# Patient Record
Sex: MALE | Race: WHITE | Employment: FULL TIME | URBAN - METROPOLITAN AREA
[De-identification: names, ages, dates, MRNs, and addresses within clinical notes are randomized per-mention and may not be internally consistent; named-entity substitution may affect disease eponyms.]

---

## 2020-09-08 ENCOUNTER — HOSPITAL ENCOUNTER (EMERGENCY)
Facility: CLINIC | Age: 50
Discharge: HOME | End: 2020-09-08
Attending: EMERGENCY MEDICINE | Admitting: EMERGENCY MEDICINE
Payer: OTHER GOVERNMENT

## 2020-09-08 VITALS
HEART RATE: 79 BPM | BODY MASS INDEX: 33.64 KG/M2 | RESPIRATION RATE: 18 BRPM | HEIGHT: 70 IN | WEIGHT: 235 LBS | OXYGEN SATURATION: 98 % | DIASTOLIC BLOOD PRESSURE: 97 MMHG | TEMPERATURE: 97.7 F | SYSTOLIC BLOOD PRESSURE: 143 MMHG

## 2020-09-08 DIAGNOSIS — R19.7 DIARRHEA, UNSPECIFIED TYPE: ICD-10-CM

## 2020-09-08 DIAGNOSIS — Z20.822 EXPOSURE TO COVID-19 VIRUS: ICD-10-CM

## 2020-09-08 PROCEDURE — U0003 INFECTIOUS AGENT DETECTION BY NUCLEIC ACID (DNA OR RNA); SEVERE ACUTE RESPIRATORY SYNDROME CORONAVIRUS 2 (SARS-COV-2) (CORONAVIRUS DISEASE [COVID-19]), AMPLIFIED PROBE TECHNIQUE, MAKING USE OF HIGH THROUGHPUT TECHNOLOGIES AS DESCRIBED BY CMS-2020-01-R: HCPCS | Performed by: EMERGENCY MEDICINE

## 2020-09-08 PROCEDURE — C9803 HOPD COVID-19 SPEC COLLECT: HCPCS

## 2020-09-08 PROCEDURE — 99283 EMERGENCY DEPT VISIT LOW MDM: CPT

## 2020-09-08 ASSESSMENT — MIFFLIN-ST. JEOR: SCORE: 1922.2

## 2020-09-08 NOTE — ED TRIAGE NOTES
Needs a doctors note stating he can return to work after missing 2 days of work last week due to a headache and diarrhea.  No complaints now.  ABCDs intact.

## 2020-09-08 NOTE — ED AVS SNAPSHOT
Emergency Department  [address] [place] [address]  Phone:  [phone]  Fax:  [phone]                                    Hunter De Souza   MRN: [id number]    Department:   Emergency Department   Date of Visit:  9/8/2020           After Visit Summary Signature Page    I have received my discharge instructions, and my questions have been answered. I have discussed any challenges I see with this plan with the nurse or doctor.    ..........................................................................................................................................  Patient/Patient Representative Signature      ..........................................................................................................................................  Patient Representative Print Name and Relationship to Patient    ..................................................               ................................................  Date                                   Time    ..........................................................................................................................................  Reviewed by Signature/Title    ...................................................              ..............................................  Date                                               Time          22EPIC Rev 08/18

## 2020-09-08 NOTE — ED PROVIDER NOTES
"History     Chief Complaint:  Covid 19 Testing       HPI   Hunter De Souza is a 50 year old male who presents with symptoms of mild headache area, and congestion which began 1 week ago.  Symptoms have completely resolved.  However, he needed to call into work sick last week and they will not allow him back to work unless he has a negative COVID test.  The patient notes that all symptoms have been resolved throughout the weekend.  He has not taken any over-the-counter medications.  He denies any abdominal pain and notes he has been eating and drinking normally.  He never suffered a fever with this.  He denies any exposures to coronavirus.  Other members that live in his household are healthy and well.  He is simply requesting a note for work and has no other concerns today.    Allergies:  Chocolate     Medications:    Medications reviewed. No current medications.     Past Medical History:    Medical history reviewed. No pertinent medical history.    Past Surgical History:    Surgical history reviewed. No pertinent surgical history.    Family History:    Family history reviewed. No pertinent family history.      Social History:  Smoking Status: former      Review of Systems  Pertinent positives and negatives as above.  A 14-point review of systems was performed with all other systems reviewed as negative.      Physical Exam     Patient Vitals for the past 24 hrs:   BP Temp Temp src Pulse Resp SpO2 Height Weight   09/08/20 1438 (!) 143/97 97.7  F (36.5  C) Oral 79 18 98 % 1.778 m (5' 10\") 106.6 kg (235 lb)   09/08/20 1430 (!) 140/80 98.1  F (36.7  C) Temporal 83 16 99 % 1.778 m (5' 10\") --        Physical Exam  Eye:  Pupils are equal, round, and reactive.  Extraocular movements intact.    ENT:  No rhinorrhea.  Moist mucus membranes.  Normal tongue and tonsil.    Cardiac:  Regular rate and rhythm.  No murmurs, gallops, or rubs.    Pulmonary:  Clear to auscultation bilaterally.  No wheezes, rales, or rhonchi.    Abdomen:  " Positive bowel sounds.  Abdomen is soft and non-distended, without focal tenderness.    Musculoskeletal:  Normal movement of all extremities without evidence for deficit.    Skin:  Warm and dry without rashes.    Neurologic:  Non-focal exam without asymmetric weakness or numbness.     Psychiatric:  Normal affect with appropriate interaction with examiner.      Emergency Department Course     Laboratory:  Laboratory findings were communicated with the patient who voiced understanding of the findings.    Symptomatic COVID-19 Virus (Coronavirus) by PCR Nasopharyngeal swab: pending      Emergency Department Course:  Past medical records, nursing notes, and vitals reviewed.    1444 I performed an exam of the patient as documented above.      Findings and plan explained to the Patient. Patient discharged home with instructions regarding supportive care, medications, and reasons to return. The importance of close follow-up was reviewed.     Impression & Plan   Covid-19  Hunter De Souza was evaluated during a global COVID-19 pandemic, which necessitated consideration that the patient might be at risk for infection with the SARS-CoV-2 virus that causes COVID-19.   Applicable protocols for evaluation were followed during the patient's care.   COVID-19 was considered as part of the patient's evaluation. The plan for testing is:  a test was obtained during this visit.     Medical Decision Making:  Hunter De Souza is a 50 year old male who presents to the emergency department today with requests for a work note to return to work.  His symptoms seem highly unlikely to represent a COVID-19 infection.  However, he is not yet 10 days out from the first day of his symptoms.  We will send a COVID test today which should result later today or early tomorrow.  I wrote him a note for work that he should be able to return tomorrow if this study is negative.  If the test is positive, he will require a longer quarantine period.  Otherwise, he  will return to us for any worsening of condition or other emergent concerns.      Discharge Diagnosis:    ICD-10-CM    1. Diarrhea, unspecified type  R19.7     RESOLVED   2. Exposure to COVID-19 virus  Z20.828        Disposition:  The patient is discharged to home.    Scribe Disclosure:  I, Bakari Betsy, am serving as a scribe at 2:44 PM on 9/8/2020 to document services personally performed by Trierweiler, Chad A, MD based on my observations and the provider's statements to me.      9/8/2020   Trierweiler, Chad A, MD Trierweiler, Chad A, MD  09/08/20 1559

## 2020-09-08 NOTE — LETTER
September 8, 2020      To Whom It May Concern:      Hunter De Souza was seen in our Emergency Department today, 09/08/20.  He will be tested for COVID with results later today.  If these results are negative, he may return to work immediately without restrictions.    Sincerely,        Chad A. Trierweiler, MD

## 2020-09-08 NOTE — ED TRIAGE NOTES
"Headache and diarrhea last week. \"I came here to get a note saying I can return to work.\" He is not having symptoms right now but was not tested for COVID.   "

## 2020-09-09 DIAGNOSIS — Z20.822 SUSPECTED COVID-19 VIRUS INFECTION: Primary | ICD-10-CM

## 2020-09-09 LAB
SARS-COV-2 RNA SPEC QL NAA+PROBE: NOT DETECTED
SPECIMEN SOURCE: NORMAL

## 2020-09-09 NOTE — PROGRESS NOTES
Received notification of ED visit with COVID -19 testing done and no PCP listed for follow up care. Referral for care coordination services entered to outreach to patient.    Dianne Rahman RN  [facility] Primary Care-Care Coordination  [facility]-Integrated Primary [facility]  [facility]  [phone]

## 2020-09-15 ENCOUNTER — PATIENT OUTREACH (OUTPATIENT)
Dept: CARE COORDINATION | Facility: CLINIC | Age: 50
End: 2020-09-15

## 2020-09-15 NOTE — PROGRESS NOTES
Clinic Care Coordination Contact  Care Team Conversations    Received notification of ED visit with COVID -19 testing done and no PCP listed for follow up care. Referral for care coordination services entered to outreach to patient. Attempted to contact patient but receive message that per the subscriber's request this phone number does not accept incoming calls. Will try again in 1-2 business days.    Dianne Rahman RN  [facility] Primary Care-Care Coordination  [facility]-Integrated Primary Care  [facility]  [phone]

## 2020-09-17 NOTE — PROGRESS NOTES
Clinic Care Coordination Contact  Care Team Conversations    Received notification of ED visit with COVID -19 testing done and no PCP listed for follow up care. Referral for care coordination services entered to outreach to patient. Attempted to contact patient but receive message that per the subscriber's request this phone number does not accept incoming calls. Will attempt no further outreach calls to patient.     Dianne Rahman RN  [facility] Primary Care-Care Coordination  [facility]-Integrated Primary Care  [facility]  [phone]

## 2021-01-09 ENCOUNTER — HEALTH MAINTENANCE LETTER (OUTPATIENT)
Age: 51
End: 2021-01-09

## 2021-10-23 ENCOUNTER — HEALTH MAINTENANCE LETTER (OUTPATIENT)
Age: 51
End: 2021-10-23

## 2022-02-12 ENCOUNTER — HEALTH MAINTENANCE LETTER (OUTPATIENT)
Age: 52
End: 2022-02-12

## 2022-10-09 ENCOUNTER — HEALTH MAINTENANCE LETTER (OUTPATIENT)
Age: 52
End: 2022-10-09

## 2023-03-25 ENCOUNTER — HEALTH MAINTENANCE LETTER (OUTPATIENT)
Age: 53
End: 2023-03-25